# Patient Record
Sex: FEMALE | Race: BLACK OR AFRICAN AMERICAN
[De-identification: names, ages, dates, MRNs, and addresses within clinical notes are randomized per-mention and may not be internally consistent; named-entity substitution may affect disease eponyms.]

---

## 2019-06-18 ENCOUNTER — HOSPITAL ENCOUNTER (EMERGENCY)
Dept: HOSPITAL 97 - ER | Age: 34
Discharge: HOME | End: 2019-06-18
Payer: COMMERCIAL

## 2019-06-18 DIAGNOSIS — J31.2: Primary | ICD-10-CM

## 2019-06-18 LAB
ALBUMIN SERPL BCP-MCNC: 3.8 G/DL (ref 3.4–5)
ALP SERPL-CCNC: 80 U/L (ref 45–117)
ALT SERPL W P-5'-P-CCNC: 14 U/L (ref 12–78)
AST SERPL W P-5'-P-CCNC: 13 U/L (ref 15–37)
BUN BLD-MCNC: 11 MG/DL (ref 7–18)
GLUCOSE SERPLBLD-MCNC: 95 MG/DL (ref 74–106)
HCT VFR BLD CALC: 32.5 % (ref 36–45)
LYMPHOCYTES # SPEC AUTO: 2.1 K/UL (ref 0.7–4.9)
PMV BLD: 8 FL (ref 7.6–11.3)
POTASSIUM SERPL-SCNC: 3.9 MMOL/L (ref 3.5–5.1)
RBC # BLD: 4.28 M/UL (ref 3.86–4.86)

## 2019-06-18 PROCEDURE — 81003 URINALYSIS AUTO W/O SCOPE: CPT

## 2019-06-18 PROCEDURE — 81025 URINE PREGNANCY TEST: CPT

## 2019-06-18 PROCEDURE — 87070 CULTURE OTHR SPECIMN AEROBIC: CPT

## 2019-06-18 PROCEDURE — 87081 CULTURE SCREEN ONLY: CPT

## 2019-06-18 PROCEDURE — 36415 COLL VENOUS BLD VENIPUNCTURE: CPT

## 2019-06-18 PROCEDURE — 80053 COMPREHEN METABOLIC PANEL: CPT

## 2019-06-18 PROCEDURE — 99284 EMERGENCY DEPT VISIT MOD MDM: CPT

## 2019-06-18 PROCEDURE — 85025 COMPLETE CBC W/AUTO DIFF WBC: CPT

## 2019-06-18 PROCEDURE — 86308 HETEROPHILE ANTIBODY SCREEN: CPT

## 2019-06-18 NOTE — EDPHYS
Physician Documentation                                                                           

 [facility]                                                                 

Name: Kristal Pastrana                                                                           

Age: 33 yrs                                                                                       

Sex: Female                                                                                       

: 1985                                                                                   

MRN: [id number]                                                                                   

Arrival Date: 2019                                                                          

Time: 16:23                                                                                       

Account#: [id number]                                                                            

Bed 28                                                                                            

Private MD:                                                                                       

ED Physician Luis Hammer                                                                       

HPI:                                                                                              

                                                                                             

17:06 This 33 yrs old Black Female presents to ER via Ambulatory with complaints of Breathing jmm 

      Difficulty, Sore Throat.                                                                    

17:06 The patient presents with sore throat. Onset: The symptoms/episode began/occurred       jmm 

      gradually, 3 month(s) ago. Modifying factors: The symptoms are alleviated by nothing,       

      the symptoms are aggravated by nothing. Associated signs and symptoms: Pertinent            

      positives: shortness of breath Pertinent negatives cough, fever. This is a 33 year old      

      female with a history of anemia that presents to the ED with complaints of intermittent     

      throat pain and swelling for the past 3 months worsening today. patient denies fever or     

      cough. .                                                                                    

                                                                                                  

OB/GYN:                                                                                           

16:34 LMP 2019                                                                              

                                                                                                  

Historical:                                                                                       

- Allergies:                                                                                      

16:33 No Known Allergies;                                                                     hj  

- PMHx:                                                                                           

16:33 None;                                                                                   hj  

- PSHx:                                                                                           

16:33 None;                                                                                   hj  

                                                                                                  

- Immunization history:: Adult Immunizations up to date.                                          

- Social history:: Smoking status: Patient/guardian denies using tobacco.                         

- Ebola Screening: : Patient negative for fever greater than or equal to 101.5 degrees            

  Fahrenheit, and additional compatible Ebola Virus Disease symptoms Patient denies               

  exposure to infectious person Patient denies travel to an Ebola-affected area in the            

  21 days before illness onset No symptoms or risks identified at this time.                      

                                                                                                  

                                                                                                  

ROS:                                                                                              

17:06 Constitutional: Negative for fever, chills, and weight loss, Cardiovascular: Negative   jmm 

      for chest pain, palpitations, and edema.                                                    

17:06 Abdomen/GI: Negative for abdominal pain, nausea, vomiting, diarrhea, and constipation,      

      Back: Negative for injury and pain, Neuro: Negative for headache, weakness, numbness,       

      tingling, and seizure.                                                                      

17:06 ENT: Positive for sore throat.                                                              

17:06 Respiratory: Positive for shortness of breath.                                              

17:06 All other systems are negative.                                                             

                                                                                                  

Exam:                                                                                             

17:06 Constitutional:  This is a well developed, well nourished patient who is awake, alert,  jmm 

      and in no acute distress. Head/Face:  atraumatic. Eyes:  EOMI, no conjunctival erythema     

      appreciated                                                                                 

17:06 Neck:  Trachea midline, Supple Chest/axilla:  Normal chest wall appearance and motion.      

                                                                                                  

17:06 Abdomen/GI:  Non distended, soft Back:  Normal ROM Skin:  General appearance color          

      normal MS/ Extremity:  Moves all extremities, no obvious deformities appreciated, no        

      edema noted to the lower extremities  Neuro:  Awake and alert, normal gait Psych:           

      Behavior is normal, Mood is normal, Patient is cooperative and pleasant                     

17:06 ENT: Posterior pharynx: erythema, that is mild.                                             

17:06 Cardiovascular: Rate: normal, Rhythm: regular.                                              

17:06 Respiratory: the patient does not display signs of respiratory distress,  Respirations:     

      normal, Breath sounds: are clear throughout.                                                

                                                                                                  

Vital Signs:                                                                                      

16:34  / 81; Pulse 70; Resp 18; Temp 98.8(O); Pulse Ox 100% on R/A; Weight 79.83 kg;    hj  

      Height 5 ft. 7 in. (170.18 cm); Pain 6/10;                                                  

16:34 Body Mass Index 27.56 (79.83 kg, 170.18 cm)                                               

                                                                                                  

MDM:                                                                                              

17:06 Patient medically screened.                                                             [facility] 

19:02 Data reviewed: vital signs, nurses notes. Counseling: I had a detailed discussion with  ana luisa 

      the patient and/or guardian regarding: the historical points, exam findings, and any        

      diagnostic results supporting the discharge/admit diagnosis, lab results, the need for      

      outpatient follow up, to return to the emergency department if symptoms worsen or           

      persist or if there are any questions or concerns that arise at home. ED course:            

      Symptoms are chronic. Patient is alert and non toxic in appearance in the ED. No signs      

      of resp distress are appreciated. I discussed the need for further evaluation by ENT/GI     

      and otherwise given strict return precautions. Patient understood and agrees with the       

      plan of care. .                                                                             

                                                                                                  

                                                                                             

17:06 Order name: Strep                                                                       [facility] 

                                                                                             

17:06 Order name: Mono Screen Profile                                                         [facility] 

                                                                                             

17:06 Order name: CBC with Diff; Complete Time: 18:14                                         [facility] 

                                                                                             

17: Order name: CMP; Complete Time: 18:44                                                   [facility] 

                                                                                             

17:10 Order name: Group A Streptococcus Rapid Sc; Complete Time: 18:44                        [facility]

                                                                                             

17:10 Order name: Mono Screen; Complete Time: 18:58                                           [facility]

                                                                                             

17:06 Order name: Urine Dipstick-Ancillary (obtain specimen); Complete Time: 17:48            [facility] 

                                                                                             

17:06 Order name: Saline Lock; Complete Time: 17:46                                           [facility] 

                                                                                             

17:56 Order name: Urine Dipstick--Ancillary (enter results); Complete Time: 18:44               

                                                                                             

17:56 Order name: Urine Pregnancy--Ancillary (enter results); Complete Time: 18:44              

                                                                                             

18:29 Order name: Throat Culture                                                              [facility]

                                                                                                  

Administered Medications:                                                                         

No medications were administered                                                                  

                                                                                                  

                                                                                                  

Disposition:                                                                                      

                                                                                             

07:49 Co-signature as Attending Physician, Luis Hammer MD I agree with the assessment and   kdr 

      plan of care.                                                                               

                                                                                                  

Disposition:                                                                                      

19 19:03 Discharged to Home. Impression: Chronic pharyngitis.                               

- Condition is Stable.                                                                            

- Discharge Instructions: Pharyngitis.                                                            

- Prescriptions for Medrol (Herminio) 4 mg Oral Tablets, Dose Pack - take 1 tablet by ORAL             

  route as directed - follow package instructions; 1 packet.                                      

- Medication Reconciliation Form, Thank You Letter, Antibiotic Education, Prescription            

  Opioid Use form.                                                                                

- Follow up: Ester Anderson MD; When: 1 - 2 days; Reason: Recheck today's                    

  complaints, Continuance of care, Re-evaluation by your physician. Follow up: Mick Gudino MD; When: 2 - 3 days; Reason: Recheck today's complaints, Continuance of               

  care, Re-evaluation by your physician.                                                          

                                                                                                  

                                                                                                  

                                                                                                  

Signatures:                                                                                       

Dispatcher [employer]                           [facility]                                                 

Brady Astorga RN RN sg Rittger, Kevin, MD MD kdr Mickail, Joel, PA PA   [facility]                                                  

Steve Sevilla RN                      RN                                                      

                                                                                                  

Corrections: (The following items were deleted from the chart)                                    

                                                                                             

19:15 19:03 2019 19:03 Discharged to Home. Impression: Chronic pharyngitis. Condition   sg  

      is Stable. Forms are Medication Reconciliation Form, Thank You Letter, Antibiotic           

      Education, Prescription Opioid Use. Follow up: Ester Anderson; When: 1 - 2 days;         

      Reason: Recheck today's complaints, Continuance of care, Re-evaluation by your              

      physician. Follow up: Mick Gudino; When: 2 - 3 days; Reason: Recheck today's             

      complaints, Continuance of care, Re-evaluation by your physician. cem                       

                                                                                                  

**************************************************************************************************

## 2019-06-18 NOTE — ER
Nurse's Notes                                                                                     

 [facility]                                                                 

Name: Kristal Pastrana                                                                           

Age: 33 yrs                                                                                       

Sex: Female                                                                                       

: 1985                                                                                   

MRN: [id number]                                                                                   

Arrival Date: 2019                                                                          

Time: 16:23                                                                                       

Account#: [id number]                                                                            

Bed 28                                                                                            

Private MD:                                                                                       

Diagnosis: Chronic pharyngitis                                                                    

                                                                                                  

Presentation:                                                                                     

                                                                                             

16:32 Presenting complaint: Patient states: i cannot breathe and its been of and on; my       hj  

      throat is sore and i feel its closing on me; this started this morning; denies fever        

      and chills;. Transition of care: patient was not received from another setting of care.     

      Onset of symptoms was 2019. Risk Assessment: Do you want to hurt yourself or       

      someone else? Patient reports no desire to harm self or others. Initial Sepsis Screen:      

      Does the patient meet any 2 criteria? No. Patient's initial sepsis screen is negative.      

      Does the patient have a suspected source of infection? No. Patient's initial sepsis         

      screen is negative. Care prior to arrival: None.                                            

16:32 Method Of Arrival: Ambulatory                                                             

16:32 Acuity: CHARLENE 3                                                                             

                                                                                                  

OB/GYN:                                                                                           

16:34 LMP 2019                                                                              

                                                                                                  

Historical:                                                                                       

- Allergies:                                                                                      

16:33 No Known Allergies;                                                                       

- PMHx:                                                                                           

16:33 None;                                                                                   hj  

- PSHx:                                                                                           

16:33 None;                                                                                   hj  

                                                                                                  

- Immunization history:: Adult Immunizations up to date.                                          

- Social history:: Smoking status: Patient/guardian denies using tobacco.                         

- Ebola Screening: : Patient negative for fever greater than or equal to 101.5 degrees            

  Fahrenheit, and additional compatible Ebola Virus Disease symptoms Patient denies               

  exposure to infectious person Patient denies travel to an Ebola-affected area in the            

  21 days before illness onset No symptoms or risks identified at this time.                      

                                                                                                  

                                                                                                  

Screenin:40 Abuse screen: Denies threats or abuse. Denies injuries from another. Nutritional        sg  

      screening: No deficits noted. Tuberculosis screening: No symptoms or risk factors           

      identified. Never had TB. Fall Risk None identified.                                        

                                                                                                  

Assessment:                                                                                       

17:40 General: Appears in no apparent distress. well groomed, well developed, well nourished, sg  

      Behavior is calm, cooperative, appropriate for age. Pain: Complains of pain in sore         

      throat Quality of pain is described as aching. Neuro: Level of Consciousness is awake,      

      alert, obeys commands, Oriented to person, place, time, Speech is normal, Facial            

      symmetry appears normal. Cardiovascular: Capillary refill is brisk in bilateral fingers     

      Patient's skin is warm and dry. Chest pain is denied. Cardiovascular: Heart tones S1 S2     

      present. Respiratory: Airway is patent Respiratory effort is even, unlabored,               

      Respiratory pattern is regular, symmetrical, Breath sounds are clear. GI: Abdomen is        

      round non-distended, Bowel sounds present X 4 quads. : No signs and/or symptoms were      

      reported regarding the genitourinary system. EENT: Reports pain when swallowing sore        

      throat. Derm: Skin is pink, warm \T\ dry. Musculoskeletal: Circulation, motion, and         

      sensation intact. Range of motion: intact in all extremities.                               

                                                                                                  

Vital Signs:                                                                                      

16:34  / 81; Pulse 70; Resp 18; Temp 98.8(O); Pulse Ox 100% on R/A; Weight 79.83 kg;    hj  

      Height 5 ft. 7 in. (170.18 cm); Pain 6/10;                                                  

16:34 Body Mass Index 27.56 (79.83 kg, 170.18 cm)                                               

                                                                                                  

ED Course:                                                                                        

16:23 Patient arrived in ED.                                                                  mr  

16:33 Triage completed.                                                                         

16:33 Arm band placed on.                                                                       

16:47 Facundo Oneal PA is PHCP.                                                              [facility] 

16:47 Luis Hammer MD is Attending Physician.                                              [facility] 

16:51 Brady Astorga, RN is Primary Nurse.                                                         

17:42 Initial lab(s) drawn, by me, sent to lab. Strep swab sent to lab. Inserted saline lock: sg  

      22 gauge in left upper arm, using aseptic technique. Blood collected.                       

19:03 Ester Anderson MD is Referral Physician.                                           [facility] 

19:03 Mick Gudino MD is Referral Physician.                                              [facility] 

19:10 Patient has correct armband on for positive identification. Bed in low position. Call   sg  

      light in reach. Pulse ox on. NIBP on.                                                       

19:10 No provider procedures requiring assistance completed. IV discontinued, intact,         sg  

      bleeding controlled, No redness/swelling at site. Pressure dressing applied.                

                                                                                                  

Administered Medications:                                                                         

No medications were administered                                                                  

                                                                                                  

                                                                                                  

Outcome:                                                                                          

19:03 Discharge ordered by MD.                                                                [facility] 

19:10 Discharged to home ambulatory, with family.                                               

19:10 Condition: good                                                                             

19:10 Discharge instructions given to patient, Instructed on discharge instructions, follow       

      up and referral plans. medication usage, safety practices, Demonstrated understanding       

      of instructions, follow-up care, medications, Prescriptions given X 1.                      

19:15 Patient left the ED.                                                                    sg  

                                                                                                  

Signatures:                                                                                       

Brady Astorga RN                         RN   Facundo Jc PA PA jmm Rivera, Mary                                 mr                                                   

RoelSteve RN RN hj                                                   

                                                                                                  

Corrections: (The following items were deleted from the chart)                                    

16:35 16:34 Pulse 70bpm; Resp 18bpm; Pulse Ox 100% RA; Temp 98.8F Oral; 79.83 kg; Height 5    hj  

      ft. 7 in.; BMI: 27.5; Pain 6/10; hj                                                         

17:16 16:32 Acuity: CHARLENE 4 hj                                                                  hj  

                                                                                                  

**************************************************************************************************